# Patient Record
Sex: FEMALE | Race: WHITE | NOT HISPANIC OR LATINO | Employment: FULL TIME | URBAN - NONMETROPOLITAN AREA
[De-identification: names, ages, dates, MRNs, and addresses within clinical notes are randomized per-mention and may not be internally consistent; named-entity substitution may affect disease eponyms.]

---

## 2017-01-09 ENCOUNTER — OFFICE VISIT (OUTPATIENT)
Dept: ONCOLOGY | Facility: CLINIC | Age: 57
End: 2017-01-09

## 2017-01-09 ENCOUNTER — LAB (OUTPATIENT)
Dept: ONCOLOGY | Facility: CLINIC | Age: 57
End: 2017-01-09

## 2017-01-09 ENCOUNTER — INFUSION (OUTPATIENT)
Dept: ONCOLOGY | Facility: HOSPITAL | Age: 57
End: 2017-01-09

## 2017-01-09 VITALS
OXYGEN SATURATION: 98 % | TEMPERATURE: 97.6 F | SYSTOLIC BLOOD PRESSURE: 121 MMHG | HEART RATE: 70 BPM | BODY MASS INDEX: 27.48 KG/M2 | RESPIRATION RATE: 18 BRPM | DIASTOLIC BLOOD PRESSURE: 71 MMHG | HEIGHT: 60 IN | WEIGHT: 140 LBS

## 2017-01-09 VITALS
RESPIRATION RATE: 17 BRPM | WEIGHT: 140.3 LBS | TEMPERATURE: 97.8 F | DIASTOLIC BLOOD PRESSURE: 76 MMHG | HEART RATE: 72 BPM | SYSTOLIC BLOOD PRESSURE: 124 MMHG | BODY MASS INDEX: 27.55 KG/M2 | HEIGHT: 60 IN | OXYGEN SATURATION: 98 %

## 2017-01-09 DIAGNOSIS — C90.00 MULTIPLE MYELOMA, WITHOUT MENTION OF HAVING ACHIEVED REMISSION: Primary | ICD-10-CM

## 2017-01-09 DIAGNOSIS — C90.00 MULTIPLE MYELOMA, WITHOUT MENTION OF HAVING ACHIEVED REMISSION: ICD-10-CM

## 2017-01-09 LAB
ALBUMIN SERPL-MCNC: 4.5 G/DL (ref 3.5–5)
ALBUMIN/GLOB SERPL: 1.5 G/DL
ALP SERPL-CCNC: 63 U/L (ref 38–126)
ALT SERPL W P-5'-P-CCNC: 28 U/L (ref 9–52)
ANION GAP SERPL CALCULATED.3IONS-SCNC: 10 MMOL/L
AST SERPL-CCNC: 32 U/L (ref 5–40)
AUTO MIXED CELLS #: 0.3 10*3/UL (ref 0.1–1.5)
AUTO MIXED CELLS %: 6.8 % (ref 0.2–15.1)
BILIRUB SERPL-MCNC: 0.6 MG/DL (ref 0.2–1.3)
BUN BLD-MCNC: 26 MG/DL (ref 7–26)
BUN/CREAT SERPL: 20 (ref 7–25)
CALCIUM SPEC-SCNC: 9.7 MG/DL (ref 8.4–10.2)
CHLORIDE SERPL-SCNC: 104 MMOL/L (ref 98–107)
CO2 SERPL-SCNC: 30 MMOL/L (ref 22–30)
CREAT BLD-MCNC: 1.3 MG/DL (ref 0.7–1.4)
ERYTHROCYTE [DISTWIDTH] IN BLOOD BY AUTOMATED COUNT: 13.6 % (ref 11.5–14.5)
GFR SERPL CREATININE-BSD FRML MDRD: 42 ML/MIN/1.73
GLOBULIN UR ELPH-MCNC: 3 GM/DL
GLUCOSE BLD-MCNC: 123 MG/DL (ref 75–110)
HCT VFR BLD AUTO: 39.5 % (ref 37–47)
HGB BLD-MCNC: 11.5 G/DL (ref 12–16)
LYMPHOCYTES # BLD AUTO: 1.3 10*3/MM3 (ref 0.8–7)
LYMPHOCYTES NFR BLD AUTO: 26.7 % (ref 10–58.5)
MAGNESIUM SERPL-MCNC: 2 MG/DL (ref 1.7–2.2)
MCH RBC QN AUTO: 29.3 PG (ref 27–31)
MCHC RBC AUTO-ENTMCNC: 29.1 G/DL (ref 33–37)
MCV RBC AUTO: 100.8 FL (ref 81–99)
NEUTROPHILS # BLD AUTO: 3.3 10*3/MM3 (ref 2–7.8)
NEUTROPHILS NFR BLD AUTO: 66.5 % (ref 37–92)
PHOSPHATE SERPL-MCNC: 3.5 MG/DL (ref 2.5–4.5)
PLATELET # BLD AUTO: 155 10*3/MM3 (ref 130–400)
PMV BLD AUTO: 10.6 FL (ref 6–12)
POTASSIUM BLD-SCNC: 4.4 MMOL/L (ref 3.6–5)
PROT SERPL-MCNC: 7.5 G/DL (ref 6.3–8.2)
RBC # BLD AUTO: 3.92 10*6/MM3 (ref 4.2–5.4)
SODIUM BLD-SCNC: 144 MMOL/L (ref 137–145)
WBC NRBC COR # BLD: 5 10*3/MM3 (ref 4.8–10.8)

## 2017-01-09 PROCEDURE — 80053 COMPREHEN METABOLIC PANEL: CPT | Performed by: INTERNAL MEDICINE

## 2017-01-09 PROCEDURE — 85025 COMPLETE CBC W/AUTO DIFF WBC: CPT | Performed by: INTERNAL MEDICINE

## 2017-01-09 PROCEDURE — 36415 COLL VENOUS BLD VENIPUNCTURE: CPT | Performed by: INTERNAL MEDICINE

## 2017-01-09 PROCEDURE — 96366 THER/PROPH/DIAG IV INF ADDON: CPT

## 2017-01-09 PROCEDURE — 99214 OFFICE O/P EST MOD 30 MIN: CPT | Performed by: INTERNAL MEDICINE

## 2017-01-09 PROCEDURE — 83735 ASSAY OF MAGNESIUM: CPT | Performed by: INTERNAL MEDICINE

## 2017-01-09 PROCEDURE — 96365 THER/PROPH/DIAG IV INF INIT: CPT

## 2017-01-09 PROCEDURE — 84100 ASSAY OF PHOSPHORUS: CPT | Performed by: INTERNAL MEDICINE

## 2017-01-09 PROCEDURE — 25010000002 PAMIDRONATE DISODIUM PER 30 MG: Performed by: NURSE PRACTITIONER

## 2017-01-09 RX ORDER — SODIUM CHLORIDE 9 MG/ML
250 INJECTION, SOLUTION INTRAVENOUS ONCE
Status: COMPLETED | OUTPATIENT
Start: 2017-01-09 | End: 2017-01-09

## 2017-01-09 RX ADMIN — PAMIDRONATE DISODIUM 30 MG: 3 INJECTION INTRAVENOUS at 14:43

## 2017-01-09 RX ADMIN — SODIUM CHLORIDE 250 ML: 9 INJECTION, SOLUTION INTRAVENOUS at 14:25

## 2017-01-09 NOTE — PROGRESS NOTES
[facility]  HEMATOLOGY & ONCOLOGY        Subjective     VISIT DIAGNOSIS:   Encounter Diagnosis   Name Primary?   • Multiple myeloma, without mention of having achieved remission        REASON FOR VISIT:     No chief complaint on file.       HEMATOLOGY / ONCOLOGY HISTORY:      Multiple myeloma, without mention of having achieved remission    10/10/2016 Initial Diagnosis    Multiple myeloma, without mention of having achieved remission       Cancer Staging Information:  No matching staging information was found for the patient.      INTERVAL HISTORY  Patient ID: Josselyn Foy is a 56 y.o. year old female a history of IgG kappa chain multiple myeloma status post autologous stem cell transplant ×2 at [facility].  Patient is presently on maintenance treatment with Aredia every 3 months is continued to be free of disease.  Patient was last seen at the [facility] in April  and reports that she will follow up there in April.  Mrs. David Nixon is here today for evaluation consideration to receive another round Aredia .  Verbally patient indicates that her myeloma in remission, overall doing well and has no symptoms to report.  A 14 point review of systems is listed below and outlines the contribution of comorbidities to the activities of daily living. No acute findings on review of systems with patient. Review of chronic complaints without change from previous exam as indicated below.    Past Medical History:   Past Medical History   Diagnosis Date   • Chronic renal failure    • Diverticulosis    • Hyperglycemia    • Hyperuricemia    • Multiple myeloma    • Multiple myeloma, without mention of having achieved remission 10/10/2016   • Pancytopenia      secondary to her treatment,      Past Surgical History:   Past Surgical History   Procedure Laterality Date   • Renal biopsy     • Bone marrow biopsy     • Limbal stem cell transplant     • Other surgical history        Port d/c due to infection 4/12     Social History:   Social History     Social History   • Marital status:      Spouse name: N/A   • Number of children: N/A   • Years of education: N/A     Occupational History   • Not on file.     Social History Main Topics   • Smoking status: Former Smoker     Packs/day: 1.00     Years: 5.00     Types: Cigarettes     Quit date: 01/2004   • Smokeless tobacco: Not on file   • Alcohol use 0.6 oz/week     1 Cans of beer per week   • Drug use: No   • Sexual activity: Defer     Other Topics Concern   • Not on file     Social History Narrative     Family History:   Family History   Problem Relation Age of Onset   • Thyroid disease Mother    • Heart disease Mother    • Tuberculosis Father    • No Known Problems Brother    • Alzheimer's disease Maternal Grandmother    • Heart disease Maternal Grandfather    • No Known Problems Paternal Grandmother    • No Known Problems Paternal Grandfather    • No Known Problems Brother    • No Known Problems Daughter    • No Known Problems Son        Review of Systems   Constitutional: Negative for activity change, appetite change, chills, diaphoresis, fatigue and fever.   HENT: Negative for congestion, ear discharge, ear pain, facial swelling, hearing loss, mouth sores, nosebleeds, postnasal drip, rhinorrhea, sinus pressure, sore throat, tinnitus, trouble swallowing and voice change.    Eyes: Negative for photophobia, pain, discharge and visual disturbance.   Respiratory: Negative for apnea, cough, chest tightness, shortness of breath, wheezing and stridor.    Cardiovascular: Negative for chest pain, palpitations and leg swelling.   Gastrointestinal: Negative for abdominal distention, abdominal pain, blood in stool, constipation, diarrhea, nausea, rectal pain and vomiting.   Endocrine: Negative for cold intolerance, heat intolerance, polydipsia, polyphagia and polyuria.   Genitourinary: Negative for difficulty urinating, dysuria, flank pain,  frequency, hematuria and urgency.   Musculoskeletal: Negative for arthralgias, back pain, gait problem, joint swelling and myalgias.   Skin: Negative for color change, pallor, rash and wound.   Allergic/Immunologic: Negative for environmental allergies, food allergies and immunocompromised state.   Neurological: Negative for dizziness, tremors, seizures, syncope, speech difficulty, weakness, light-headedness, numbness and headaches.   Hematological: Negative for adenopathy. Does not bruise/bleed easily.   Psychiatric/Behavioral: Negative for agitation, confusion, hallucinations, sleep disturbance and suicidal ideas. The patient is not nervous/anxious.         Performance Status:  Asymptomatic    Medications:    Current Outpatient Prescriptions   Medication Sig Dispense Refill   • acyclovir (ZOVIRAX) 200 MG capsule Take 200 mg by mouth.     • citalopram (CeleXA) 40 MG tablet Take 40 mg by mouth Daily.     • levothyroxine (SYNTHROID, LEVOTHROID) 25 MCG tablet Take 25 mcg by mouth daily.     • Melatonin 10 MG tablet Take  by mouth.     • oseltamivir (TAMIFLU) 45 MG capsule Take one tablet every other day 10 capsule 1   • prochlorperazine (COMPAZINE) 10 MG tablet Take 10 mg by mouth.       No current facility-administered medications for this visit.        ALLERGIES:    Allergies   Allergen Reactions   • Betadine [Povidone Iodine]    • Cephalosporins    • Gentamicin    • Iodine    • Levaquin [Levofloxacin]    • Tegaderm Ag Mesh [Silver]        Objective      There were no vitals filed for this visit.      No flowsheet data found.    General Appearance: Patient is awake, alert, oriented and in no acute distress. Patient is welldeveloped, wellnourished, and appears stated age.  HEENT: Normocephalic. Sclerae clear, conjunctiva pink, extraocular movements intact, pupils, round, reactive to light and accommodation. Mouth and throat are clear with moist oral mucosa.  NECK: Supple, no jugular venous distention, thyroid not  enlarged.  LYMPH: No cervical, supraclavicular, axillary, or inguinal lymphadenopathy.  CHEST: Equal bilateral expansion.  LUNGS: Good air movement, no rales, rhonchi, rubs or wheezes with auscultation  CARDIO: Regular sinus rhythm, no murmurs, gallops or rubs.  ABDOMEN: Nondistended, soft, No tenderness, no guarding, no rebound, No hepatosplenomegaly. No abdominal masses. Bowel sounds positive. No hernia  GENITALIA: Not examined.  BREASTS: Not examined.  MUSKEL: No joint swelling, decreased motion, or inflammation  EXTREMS: No edema, clubbing, cyanosis, No varicose veins.  NEURO: Grossly nonfocal, Gait is coordinated and smooth, Cognition is preserved.  SKIN: No rashes, no ecchymoses, no petechia.  PSYCH: Oriented to time, place and person. Memory is preserved. Mood and affect appear normal          Assessment/Plan     Patient Active Problem List   Diagnosis   • Multiple myeloma, without mention of having achieved remission      Assessment:  1. IgG kappa chain myeloma, status post tandem transplant x2 in [place], and complete remission. Presently receiving mainance Aredia every 3 months.Cycle 8 today.  2.  Multifactorial anemia to include iron deficiency and CKD. HGB 11.5  4. CKD stage III.  Stable, Creatinine 1.3, GFR 42.    Recommendation/Plan:   1. Aredia today.  2. Return in 3 months.  3. Laboratory studies will be performed at [place].  4. Obtain laboratory studies per [place] routine.  5.  Follow-up with the myeloma clinic in [place] April for full myeloma evaluation including serology, bone marrow evaluation and MRI imaging            Filiberto Hernandez MD    1/9/2017    1:03 PM

## 2017-01-09 NOTE — MR AVS SNAPSHOT
[facility] HEMATOLOGY & ONCOLOGY  [phone]                    Josselyn Foy   1/9/2017 1:00 PM   Office Visit    Dept Phone:  [phone]   Encounter #:  [id number]    Provider:  Filiberto Hernandez MD   Department:  [facility] HEMATOLOGY & ONCOLOGY                Your Full Care Plan              Your Updated Medication List          This list is accurate as of: 1/9/17  1:48 PM.  Always use your most recent med list.                acyclovir 200 MG capsule   Commonly known as:  ZOVIRAX       citalopram 40 MG tablet   Commonly known as:  CeleXA       levothyroxine 25 MCG tablet   Commonly known as:  SYNTHROID, LEVOTHROID       Melatonin 10 MG tablet       oseltamivir 45 MG capsule   Commonly known as:  TAMIFLU   Take one tablet every other day       prochlorperazine 10 MG tablet   Commonly known as:  COMPAZINE               We Performed the Following     Clinic Appointment Request       You Were Diagnosed With        Codes Comments    Multiple myeloma, without mention of having achieved remission     ICD-10-CM: C90.00  ICD-9-CM: 203.00       Instructions     None    Patient Instructions History      Upcoming Appointments     Visit Type Date Time Department    FOLLOW UP 1 UNIT 1/9/2017  1:00 PM [facility] ONC [place]    LAB 1/9/2017 12:45 PM [facility] ONC [place]    ONCOLOGY TREAT - [place] 1/9/2017  2:30 PM [facility] INFU ONC    FOLLOW UP 1 UNIT 4/10/2017  1:00 PM W ON[facility] [place]    LAB 4/10/2017 12:45 PM [facility]      MyChart Signup     Our records indicate that you have declined [facility] sign[facility]. If you would like to sign up for [facility], please email [email or web address] or call [phone] to obtain an activation code.             Other Info from Your Visit           Your Appointments     Jan 09, 2017  2:30 PM CST   Infusion Treatment with -3  PAD OP INFUS   [facility] OP INFU ONC– ONCOLOGY ([place])    [address] [place] [address]  "[facility] [address]   [phone]            Apr 10, 2017 12:45 PM CDT   LAB with [facility] ONC PAD LAB   [facility] HEMATOLOGY & ONCOLOGY ([place])    [address] [facility] [address]   [phone]            Apr 10, 2017  1:00 PM CDT   FOLLOW UP with Pedro Trinidad MD   [facility] HEMATOLOGY & ONCOLOGY ([place])    [address] [facility] [address]   [phone]              Allergies     Betadine [Povidone Iodine]      Cephalosporins      Gentamicin      Iodine      Levaquin [Levofloxacin]      Tegaderm Ag Mesh [Silver]        Vital Signs     Blood Pressure Pulse Temperature Respirations Height Weight    124/76 72 97.8 °F (36.6 °C) (Tympanic) 17 60\" (152.4 cm) 140 lb 4.8 oz (63.6 kg)    Oxygen Saturation Body Mass Index Smoking Status             98% 27.4 kg/m2 Former Smoker         Problems and Diagnoses Noted     Multiple myeloma        "

## 2017-06-08 ENCOUNTER — OUTSIDE FACILITY SERVICE (OUTPATIENT)
Dept: CARDIOLOGY | Facility: CLINIC | Age: 57
End: 2017-06-08

## 2017-06-08 PROCEDURE — 93306 TTE W/DOPPLER COMPLETE: CPT | Performed by: INTERNAL MEDICINE

## 2017-06-16 PROCEDURE — 93227 XTRNL ECG REC<48 HR R&I: CPT | Performed by: INTERNAL MEDICINE

## 2023-01-23 ENCOUNTER — OFFICE VISIT (OUTPATIENT)
Dept: UROLOGY | Age: 63
End: 2023-01-23
Payer: COMMERCIAL

## 2023-01-23 VITALS
WEIGHT: 162.8 LBS | BODY MASS INDEX: 32.82 KG/M2 | HEIGHT: 59 IN | SYSTOLIC BLOOD PRESSURE: 130 MMHG | DIASTOLIC BLOOD PRESSURE: 70 MMHG | TEMPERATURE: 97.9 F

## 2023-01-23 DIAGNOSIS — N39.0 RECURRENT UTI: Primary | ICD-10-CM

## 2023-01-23 LAB
APPEARANCE FLUID: CLEAR
BILIRUBIN, POC: NORMAL
BLOOD URINE, POC: NORMAL
CLARITY, POC: CLEAR
COLOR, POC: YELLOW
GLUCOSE URINE, POC: NORMAL
KETONES, POC: NORMAL
LEUKOCYTE EST, POC: NORMAL
NITRITE, POC: NORMAL
PH, POC: 7
PROTEIN, POC: NORMAL
SPECIFIC GRAVITY, POC: 1.01
UROBILINOGEN, POC: 0.2

## 2023-01-23 PROCEDURE — 81002 URINALYSIS NONAUTO W/O SCOPE: CPT | Performed by: NURSE PRACTITIONER

## 2023-01-23 PROCEDURE — 99204 OFFICE O/P NEW MOD 45 MIN: CPT | Performed by: NURSE PRACTITIONER

## 2023-01-23 RX ORDER — PHENOL 1.4 %
AEROSOL, SPRAY (ML) MUCOUS MEMBRANE
COMMUNITY

## 2023-01-23 RX ORDER — LANOLIN ALCOHOL/MO/W.PET/CERES
1 CREAM (GRAM) TOPICAL DAILY
COMMUNITY

## 2023-01-23 RX ORDER — ACYCLOVIR 200 MG/1
200 CAPSULE ORAL
COMMUNITY
Start: 2022-10-06 | End: 2023-01-23

## 2023-01-23 RX ORDER — LEVOTHYROXINE SODIUM 0.05 MG/1
TABLET ORAL
COMMUNITY
Start: 2022-12-20

## 2023-01-23 NOTE — PROGRESS NOTES
William Gómez is a 60 y.o., female, New patient who presents today   Chief Complaint   Patient presents with    New Patient     RECURRENT UTIs       HPI   Patient presents for evaluation of recurrent urinary tract infections. She reports she has been having issues with infections for several years. She is currently on antibiotic therapy now with doxycycline which has been alleviating her symptoms. She reports she has been on the medication for about 10 days. She states that her symptoms typically returned about a week after being off antibiotic therapy. She has been treated at least 4-5 times in the last couple of months. She reports symptoms when present include malodorous urine. She also reports dysuria with her most recent infections on December 16 and January 16. Otherwise, no significant change to her symptoms. Post void residual is 14 mL. Chronically, she does experience urgency as well as mixed urinary incontinence. She does utilize pads. She reports that she has been evaluated by urology and [facility], but did not wish to return to their office. She did undergo cystoscopy in 2019 which revealed mild chronic changes consistent with cystitis noted primarily in the trigone but also scattered along the posterior and lateral walls of the bladder. CT imaging at that time also revealed a 4.3 cm low-density right renal cyst.  I do not have urology records, but this information was gathered from the records sent by her primary care provider. Patient reports pelvic exam a few months ago with her primary doctor which did not reveal any prolapse. She did have a recent renal ultrasound which revealed possible left hydronephrosis. Unfortunately, I do not have images to review today. She does have history of chronic kidney disease secondary to multiple myeloma which was diagnosed on October 31, 2006. Yonas Aquino   She is currently maintained on Premarin cream as suggested by her primary care provider. Of note, she did bring some records from her oncologist which did reveal an elevated PTH. She reports no history of nephrolithiasis, although, her mother and brother have had stones. She denies any familial history of bladder or kidney cancer. 1/16/23 patient reports ecoli, but I do not have cultures  12/16/22 patient reports ecoli, but I do not have cultures  12/8/22  citrobacter koseri, contaminated spec  11/16/22  ecoli    REVIEW OF SYSTEMS:  Review of Systems   Constitutional:  Negative for chills and fever. Gastrointestinal:  Negative for abdominal distention, abdominal pain, nausea and vomiting. Genitourinary:  Positive for frequency and urgency. Negative for difficulty urinating, dysuria, flank pain and hematuria. Musculoskeletal:  Negative for back pain and gait problem. Psychiatric/Behavioral:  Negative for agitation and confusion. PHYSICAL EXAM:  /70 (Site: Right Upper Arm, Position: Sitting, Cuff Size: Medium Adult)   Temp 97.9 °F (36.6 °C)   Ht 4' 11\" (1.499 m)   Wt 162 lb 12.8 oz (73.8 kg)   BMI 32.88 kg/m²   Physical Exam  Vitals and nursing note reviewed. Constitutional:       General: She is not in acute distress. Appearance: Normal appearance. She is not ill-appearing. Pulmonary:      Effort: Pulmonary effort is normal. No respiratory distress. Abdominal:      General: There is no distension. Tenderness: There is no abdominal tenderness. There is no right CVA tenderness or left CVA tenderness. Neurological:      Mental Status: She is alert and oriented to person, place, and time. Mental status is at baseline.    Psychiatric:         Mood and Affect: Mood normal.         Behavior: Behavior normal.       DATA:  Results for orders placed or performed in visit on 01/23/23   POCT Urinalysis no Micro   Result Value Ref Range    Color, UA Yellow     Clarity, UA Clear     Glucose, UA POC Neg     Bilirubin, UA Neg     Ketones, UA Neg     Spec Grav, UA 1.010     Blood, UA POC Neg     pH, UA 7.0     Protein, UA POC Neg     Urobilinogen, UA 0.2     Leukocytes, UA Neg     Nitrite, UA Neg     Appearance, Fluid Clear Clear, Slightly Cloudy       IMAGING:    Outside imaging report. I do not have images to review today. ASSESSMENT/PLAN  1. Recurrent UTI  Patient with complaints of recurrent urinary tract infection. Recent renal ultrasound reveals possible calyceal dilation on the left. I do not have images to review today. Given her CKD, we will initiate work-up with noncontrasted CT. CT findings will dictate our next evaluation options. - POCT Urinalysis no Micro  - Bladder scan  - CT ABDOMEN PELVIS WO CONTRAST Additional Contrast? None; Future    Orders Placed This Encounter   Procedures    CT ABDOMEN PELVIS WO CONTRAST Additional Contrast? None     For renal stone protocol     Standing Status:   Future     Standing Expiration Date:   1/23/2024     Scheduling Instructions: For renal stone protocol     Order Specific Question:   Additional Contrast?     Answer:   None     Order Specific Question:   Reason for exam:     Answer:   renal colic for stone protocol    Bladder scan    POCT Urinalysis no Micro        Return in about 1 week (around 1/30/2023) for CT prior. An electronic signature was used to authenticate this note. REUBEN ALFREDO - CNP    All information inputted into the note by the MA to include chief complaint, past medical history, past surgical history, medications, allergies, social and family history and review of systems has been reviewed and updated as needed by me. EMR Dragon/transcription disclaimer: Much of this document is electronic transcription/translation of spoken language to printed text. The electronic translation of spoken language may be erroneous or, at times, nonsensical words or phrases may be inadvertently transcribed.  Although I have reviewed the document for such errors, some may still exist.

## 2023-01-25 ASSESSMENT — ENCOUNTER SYMPTOMS
VOMITING: 0
ABDOMINAL DISTENTION: 0
ABDOMINAL PAIN: 0
NAUSEA: 0
BACK PAIN: 0

## 2023-01-27 ENCOUNTER — HOSPITAL ENCOUNTER (OUTPATIENT)
Dept: CT IMAGING | Age: 63
Discharge: HOME | End: 2023-01-27
Payer: COMMERCIAL

## 2023-01-27 DIAGNOSIS — N39.0 RECURRENT UTI: ICD-10-CM

## 2023-01-27 PROCEDURE — 74176 CT ABD & PELVIS W/O CONTRAST: CPT

## 2023-02-09 ENCOUNTER — OFFICE VISIT (OUTPATIENT)
Dept: UROLOGY | Age: 63
End: 2023-02-09
Payer: COMMERCIAL

## 2023-02-09 VITALS — HEIGHT: 59 IN | BODY MASS INDEX: 33.1 KG/M2 | WEIGHT: 164.2 LBS | TEMPERATURE: 97.2 F

## 2023-02-09 DIAGNOSIS — N39.0 RECURRENT URINARY TRACT INFECTION: Primary | ICD-10-CM

## 2023-02-09 DIAGNOSIS — K57.90 DIVERTICULOSIS: ICD-10-CM

## 2023-02-09 DIAGNOSIS — Z88.8 ALLERGY TO IODINE: ICD-10-CM

## 2023-02-09 LAB
APPEARANCE FLUID: CLEAR
BILIRUBIN, POC: NORMAL
BLOOD URINE, POC: NORMAL
CLARITY, POC: CLEAR
COLOR, POC: YELLOW
GLUCOSE URINE, POC: NORMAL
KETONES, POC: NORMAL
LEUKOCYTE EST, POC: NORMAL
NITRITE, POC: NORMAL
PH, POC: 6
PROTEIN, POC: NORMAL
SPECIFIC GRAVITY, POC: 1.03
UROBILINOGEN, POC: 0.2

## 2023-02-09 PROCEDURE — 81002 URINALYSIS NONAUTO W/O SCOPE: CPT | Performed by: NURSE PRACTITIONER

## 2023-02-09 PROCEDURE — 99215 OFFICE O/P EST HI 40 MIN: CPT | Performed by: NURSE PRACTITIONER

## 2023-02-09 RX ORDER — PREDNISONE 50 MG/1
50 TABLET ORAL PRN
Qty: 3 TABLET | Refills: 0 | Status: SHIPPED | OUTPATIENT
Start: 2023-02-09

## 2023-02-09 ASSESSMENT — ENCOUNTER SYMPTOMS
NAUSEA: 0
BACK PAIN: 0
VOMITING: 0
ABDOMINAL PAIN: 0
ABDOMINAL DISTENTION: 0

## 2023-02-09 NOTE — PROGRESS NOTES
Lilia Rutledge is a 60 y.o., female, Established patient who presents today   Chief Complaint   Patient presents with    Follow-up     *CT DONE*   1 WK (UTI) FU, CT PRIOR         HPI   Patient presents for follow-up of recurrent infections described below. She returns for follow-up after CT imaging. Contrasted imaging was not performed secondary to her elevated creatinine of 1.66 with GFR of 30 at the time. CT demonstrates a right-sided simple cyst which appears to be stable from previous imaging secondary to radiologist description. I am unable to review her previous images. There is a lesion in the lower pole of the left kidney mass somewhat difficult to characterize. Outside imaging with renal ultrasound reports this is a simple cyst, but again I am unable to view these images and I am not completely convinced by CT today. I also note some air within the bladder. Patient has had recurrent recent urinary tract infections, but also has a history of diverticulosis/-itis. HPI 1/23/23  Patient presents for evaluation of recurrent urinary tract infections. She reports she has been having issues with infections for several years. She is currently on antibiotic therapy now with doxycycline which has been alleviating her symptoms. She reports she has been on the medication for about 10 days. She states that her symptoms typically returned about a week after being off antibiotic therapy. She has been treated at least 4-5 times in the last couple of months. She reports symptoms when present include malodorous urine. She also reports dysuria with her most recent infections on December 16 and January 16. Otherwise, no significant change to her symptoms. Post void residual is 14 mL. Chronically, she does experience urgency as well as mixed urinary incontinence. She does utilize pads.   She reports that she has been evaluated by urology and [facility], but did not wish to return to their office. She did undergo cystoscopy in 2019 which revealed mild chronic changes consistent with cystitis noted primarily in the trigone but also scattered along the posterior and lateral walls of the bladder. CT imaging at that time also revealed a 4.3 cm low-density right renal cyst.  I do not have urology records, but this information was gathered from the records sent by her primary care provider. Patient reports pelvic exam a few months ago with her primary doctor which did not reveal any prolapse. She did have a recent renal ultrasound which revealed possible left hydronephrosis. Unfortunately, I do not have images to review today. She does have history of chronic kidney disease secondary to multiple myeloma which was diagnosed on October 31, 2006. Anjali Shearer She is currently maintained on Premarin cream as suggested by her primary care provider. Of note, she did bring some records from her oncologist which did reveal an elevated PTH. She reports no history of nephrolithiasis, although, her mother and brother have had stones. She denies any familial history of bladder or kidney cancer. 1/16/23            patient reports ecoli, but I do not have cultures  12/16/22          patient reports ecoli, but I do not have cultures  12/8/22            citrobacter koseri, contaminated spec  11/16/22          ecoli    REVIEW OF SYSTEMS:  Review of Systems   Constitutional:  Negative for chills and fever. Gastrointestinal:  Negative for abdominal distention, abdominal pain, nausea and vomiting. Genitourinary:  Negative for difficulty urinating, dysuria, flank pain, frequency, hematuria and urgency. Musculoskeletal:  Negative for back pain and gait problem. Psychiatric/Behavioral:  Negative for agitation and confusion. PHYSICAL EXAM:  Temp 97.2 °F (36.2 °C)   Ht 4' 11\" (1.499 m)   Wt 164 lb 3.2 oz (74.5 kg)   BMI 33.16 kg/m²   Physical Exam  Vitals and nursing note reviewed.    Constitutional: General: She is not in acute distress. Appearance: Normal appearance. She is not ill-appearing. Pulmonary:      Effort: Pulmonary effort is normal. No respiratory distress. Abdominal:      General: There is no distension. Tenderness: There is no abdominal tenderness. There is no right CVA tenderness or left CVA tenderness. Neurological:      Mental Status: She is alert and oriented to person, place, and time. Mental status is at baseline. Psychiatric:         Mood and Affect: Mood normal.         Behavior: Behavior normal.       DATA:  Results for orders placed or performed in visit on 02/09/23   POCT Urinalysis no Micro   Result Value Ref Range    Color, UA yellow     Clarity, UA clear     Glucose, UA POC neg     Bilirubin, UA neg     Ketones, UA neg     Spec Grav, UA 1.030     Blood, UA POC trace     pH, UA 6.0     Protein, UA POC neg     Urobilinogen, UA 0.2     Leukocytes, UA neg     Nitrite, UA neg     Appearance, Fluid Clear Clear, Slightly Cloudy       IMAGING:  Impression   There is a droplet of air noted within the urinary bladder. This can   be seen with recent [place] catheter placement, infection, or less likely a   fistula to adjacent structures. There is suggestion of some punctate bilateral nonobstructive calculus. No renal   calculi are seen. I have reviewed CT images and detailed findings listed above in HPI. ASSESSMENT/PLAN  1. Recurrent urinary tract infection  Patient with recent recurring urinary tract infections. I evaluated with noncontrasted CT secondary to her kidney function. I will go ahead and attempt to collect contrasted images as well. Hopefully, her point-of-care creatinine will have improved. If not, we could consider retrograde pyelograms in the OR. Patient also has iodine allergy. Have ordered premedication. She will also follow-up for evaluation of cystoscopy given her history of diverticulitis as well.   - CT ABDOMEN PELVIS W IV CONTRAST Additional Contrast? Radiologist Recommendation; Future  - predniSONE (DELTASONE) 50 MG tablet; Take 1 tablet by mouth as needed (contrast) First dose should be given 13 hours prior to contrast media administration, then 7 hrs, then 1 hr. With last dose of prednisone, take 50mg of benadryl  Dispense: 3 tablet; Refill: 0  - POCT Urinalysis no Micro    2. Diverticulosis  Further contrasted imaging ordered and can hopefully be performed if improvement of renal function. - CT ABDOMEN PELVIS W IV CONTRAST Additional Contrast? Radiologist Recommendation; Future    3. Allergy to iodine  Premedication ordered. - predniSONE (DELTASONE) 50 MG tablet; Take 1 tablet by mouth as needed (contrast) First dose should be given 13 hours prior to contrast media administration, then 7 hrs, then 1 hr. With last dose of prednisone, take 50mg of benadryl  Dispense: 3 tablet; Refill: 0    At least 50 minutes were spent on the day of the visit reviewing the patient's past medical records/imaging, speaking face to face with the patient, and charting in the post visit period. Orders Placed This Encounter   Procedures    CT ABDOMEN PELVIS W IV CONTRAST Additional Contrast? Radiologist Recommendation     Please obtain delayed urographic images as well. Thank you! Standing Status:   Future     Standing Expiration Date:   2/9/2024     Order Specific Question:   Additional Contrast?     Answer:   Radiologist Recommendation     Order Specific Question:   STAT Creatinine as needed:     Answer:   Yes    POCT Urinalysis no Micro          Return for with Dr. Steven Beckett, cystoscopy, CT prior. An electronic signature was used to authenticate this note. RONALD SWEET, APRN - CNP    All information inputted into the note by the MA to include chief complaint, past medical history, past surgical history, medications, allergies, social and family history and review of systems has been reviewed and updated as needed by me.     EMR Dragon/transcription disclaimer: Much of this document is electronic transcription/translation of spoken language to printed text. The electronic translation of spoken language may be erroneous or, at times, nonsensical words or phrases may be inadvertently transcribed.  Although I have reviewed the document for such errors, some may still exist.

## 2023-04-05 ENCOUNTER — TELEPHONE (OUTPATIENT)
Dept: UROLOGY | Age: 63
End: 2023-04-05

## 2023-04-05 NOTE — TELEPHONE ENCOUNTER
Patient called stating she has a CT Scan on 4/7/23 and is allergic to iodine and wants to verify the blood draw to check her GFR will be done prior. She also is asking ti have extra fluids through her IV following the scan to flush her kidney's. Would like to speak with [facility] or her nurse.

## 2023-04-05 NOTE — TELEPHONE ENCOUNTER
I called patient back to address her concerns. She was informed at last appointment that GFR will be checked prior to her CT, and I reiterated that. She was relived and stated she was just worried after having a past experience that ended badly.

## 2023-04-07 ENCOUNTER — HOSPITAL ENCOUNTER (OUTPATIENT)
Dept: CT IMAGING | Age: 63
Discharge: HOME | End: 2023-04-07
Payer: COMMERCIAL

## 2023-04-07 DIAGNOSIS — K57.90 DIVERTICULOSIS: ICD-10-CM

## 2023-04-07 DIAGNOSIS — N39.0 RECURRENT URINARY TRACT INFECTION: ICD-10-CM

## 2023-04-07 PROCEDURE — 74177 CT ABD & PELVIS W/CONTRAST: CPT

## 2023-04-07 PROCEDURE — 6360000004 HC RX CONTRAST MEDICATION: Performed by: NURSE PRACTITIONER

## 2023-04-07 RX ADMIN — IOPAMIDOL 60 ML: 755 INJECTION, SOLUTION INTRAVENOUS at 11:52
